# Patient Record
(demographics unavailable — no encounter records)

---

## 2024-12-12 NOTE — PHYSICAL EXAM
[No Supraclavicular Adenopathy] : no supraclavicular adenopathy [No Cervical Adenopathy] : no cervical adenopathy [Clear to Auscultation Bilat] : clear to auscultation bilaterally [Examined in the supine and seated position] : examined in the supine and seated position [Symmetrical] : symmetrical [No dominant masses] : no dominant masses in right breast  [No dominant masses] : no dominant masses left breast [No Nipple Retraction] : no left nipple retraction [No Nipple Discharge] : no left nipple discharge [No Axillary Lymphadenopathy] : no left axillary lymphadenopathy [Soft] : abdomen soft [Not Tender] : non-tender [No Hepato-Splenomegaly] : no hepato-splenomegaly [No Rashes] : no rashes [de-identified] : Status post right nipple sparing mastectomy with D IEP flap.

## 2024-12-12 NOTE — REASON FOR VISIT
[Follow-Up: _____] : a [unfilled] follow-up visit [FreeTextEntry1] : Routine follow-up status post right nipple sparing mastectomy 2016

## 2024-12-12 NOTE — HISTORY OF PRESENT ILLNESS
[FreeTextEntry1] : 3/16 right nipple sparing mastectomy with sentinel node biopsy and D IEP flap 2 foci moderately differentiated invasive ductal carcinoma 9 and 21 mm, ER positive AL/HER-2 negative, node negative, T2 N0 M0, stage IIa, Oncotype 19 TC chemotherapy plus tamoxifen x5 years; letrozole x 1 year, exemestane Mother had breast cancer at age 55 1/16 my risk negative  Patient denies any breast masses or bone pain.  She recently switched to letrozole after developing a nonmalignant uterine polyp, and then to exemestane.  Recent left mammogram and ultrasound is unremarkable, BI-RADS 1.

## 2024-12-17 NOTE — ASSESSMENT
[FreeTextEntry1] : This is a 64 y/o woman with hx of T2No IDC of right breast Er pos her2 negative, s/p mastectomy , oncotype high risk s/p TCx4, on tamoxifen since 2016.  # breast cancer doing well VERN left breast mammo/sono- sept 2022. next due 09/2023 up to date with breast surgeon-Dr Bueno on 09/22/22. did not tolerate letrozole now on aromasin s/P 4 YEARS OF TAMOXIFEN  Tolerating Aromasin very well Plan for treatment till December 2024- elevated BP, if continues will stop AI December 2024 - total 8 years of therapy  Continue to monitor bone density  # osteoporosis/ osteopenia DEXA in 2022 advise every 2 yrs - repeat due now. cont vit d and exercise pt declined prolia zometa or fosamax  # vit d def - 2k cont vit d check level  # iron def - repeat level s/p gi work up cont iron check level due for colonoscopy- script given  # vaginal bleeding - resolved after tamoxifen stopped s/p gyn work up , biopsy negative now off tamoxifen , no further bleeding Continue to follow-up with GYN  # Referred to derm  # htn - FOLLOW UP WITH pcp cHECK CHOLESTEROL WITH pcp  follow up in 6 months  dw patient at length.

## 2024-12-17 NOTE — PHYSICAL EXAM
[Fully active, able to carry on all pre-disease performance without restriction] : Status 0 - Fully active, able to carry on all pre-disease performance without restriction [Normal] : affect appropriate [de-identified] : b/l breast symmetrical. R breast s/p seth flap . no masses or lesions noted. no axillary lymphadenopathy [de-identified] : multiple cherry angiomas . keratotic brown lesion. regular borders noted to R lateral breast .

## 2024-12-17 NOTE — HISTORY OF PRESENT ILLNESS
[Disease: _____________________] : Disease: [unfilled] [T: ___] : T[unfilled] [N: ___] : N[unfilled] [de-identified] : Ms. Puckett is a 61 year old post-menopausal woman diagnosed with a infiltrating ductal carcinoma of the right breast diagnosed in 2016, grade 2, ER+ 70%, [place] -, Her2-lucrecia neg.  BRCA testing was negative. She went on to see Dr King and a breast MRI revealed a second area of abnormality which was biopsied and positive for infiltrating ductal carcinoma.  She underwent a mastectomy with sentinel node biopsy on March 24, 2016 and HEBER flap reconstruction.  There were 2 foci of infiltrating ductal carcinoma measuring 2.1 and 0.9 cm.  Lymph nodes were negative, making her a stage T2N0M0 Stage 2a. An Oncotype showed a recurrence score of 29 making her on the higher end of intermediate with a 19% risk of 10 year distant recurrence with antiestrogen alone.  As a result, she underwent adjuvant TC x 4 and began Tamoxifen in August\par 2016.\par  \par  \par  tolerating letrozole  has been on it since 10/2020 [de-identified] : Patient is here for follow up. Feeling well  did not tolerate letrazole  now on aromasin - plan for 10 years 2026 ..   Mammo-11/2023 dexa-11/2022 GYN-4/2024 [facility]-2023

## 2024-12-26 NOTE — PHYSICAL EXAM
[de-identified] : b/l breast symmetrical. R breast s/p seth flap . no masses or lesions noted. no axillary lymphadenopathy [de-identified] : multiple cherry angiomas . keratotic brown lesion. regular borders noted to R lateral breast .

## 2024-12-26 NOTE — ASSESSMENT
[FreeTextEntry1] : This is a 62 y/o woman with hx of T2No IDC of right breast Er pos her2 negative, s/p mastectomy , oncotype high risk s/p TCx4, on tamoxifen since 2016.  # breast cancer doing well VERN left breast mammo/sono- sept 2022. next due 09/2023 up to date with breast surgeon-Dr Bueno on 09/22/22. did not tolerate letrozole now on aromasin s/P 4 YEARS OF TAMOXIFEN  Tolerating Aromasin very well Plan for treatment till December 2024- elevated BP, if continues will stop AI December 2024 - total 8 years of therapy  Continue to monitor bone density  # osteoporosis/ osteopenia DEXA in 2022 advise every 2 yrs - repeat due now. cont vit d and exercise pt declined prolia zometa or fosamax  # vit d def - 2k cont vit d check level  # iron def - repeat level s/p gi work up cont iron check level due for colonoscopy- script given  # vaginal bleeding - resolved after tamoxifen stopped s/p gyn work up , biopsy negative now off tamoxifen , no further bleeding Continue to follow-up with GYN  # Referred to derm   follow up in 6 months  dw patient at length.

## 2024-12-26 NOTE — HISTORY OF PRESENT ILLNESS
[de-identified] : Ms. Puckett is a 61 year old post-menopausal woman diagnosed with a infiltrating ductal carcinoma of the right breast diagnosed in 2016, grade 2, ER+ 70%, [place] -, Her2-lucrecia neg.  BRCA testing was negative. She went on to see Dr King and a breast MRI revealed a second area of abnormality which was biopsied and positive for infiltrating ductal carcinoma.  She underwent a mastectomy with sentinel node biopsy on March 24, 2016 and HEBER flap reconstruction.  There were 2 foci of infiltrating ductal carcinoma measuring 2.1 and 0.9 cm.  Lymph nodes were negative, making her a stage T2N0M0 Stage 2a. An Oncotype showed a recurrence score of 29 making her on the higher end of intermediate with a 19% risk of 10 year distant recurrence with antiestrogen alone.  As a result, she underwent adjuvant TC x 4 and began Tamoxifen in August\par 2016.\par  \par  \par  tolerating letrozole  has been on it since 10/2020 [de-identified] : Patient is here for follow up. Feeling well  did not tolerate letrazole  now on aromasin - plan for 10 years 2026 ..   Mammo-12/2024 dexa-11/2022 GYN-4/2024 [facility]-2023

## 2024-12-26 NOTE — HISTORY OF PRESENT ILLNESS
[de-identified] : Ms. Puckett is a 61 year old post-menopausal woman diagnosed with a infiltrating ductal carcinoma of the right breast diagnosed in 2016, grade 2, ER+ 70%, [place] -, Her2-lucrecia neg.  BRCA testing was negative. She went on to see Dr King and a breast MRI revealed a second area of abnormality which was biopsied and positive for infiltrating ductal carcinoma.  She underwent a mastectomy with sentinel node biopsy on March 24, 2016 and HEBER flap reconstruction.  There were 2 foci of infiltrating ductal carcinoma measuring 2.1 and 0.9 cm.  Lymph nodes were negative, making her a stage T2N0M0 Stage 2a. An Oncotype showed a recurrence score of 29 making her on the higher end of intermediate with a 19% risk of 10 year distant recurrence with antiestrogen alone.  As a result, she underwent adjuvant TC x 4 and began Tamoxifen in August\par 2016.\par  \par  \par  tolerating letrozole  has been on it since 10/2020 [de-identified] : Patient is here for follow up. Feeling well  did not tolerate letrazole  now on aromasin - plan for 10 years 2026 ..   Mammo-12/2024 dexa-11/2022 GYN-4/2024 [facility]-2023

## 2024-12-26 NOTE — ASSESSMENT
[FreeTextEntry1] : This is a 64 y/o woman with hx of T2No IDC of right breast Er pos her2 negative, s/p mastectomy , oncotype high risk s/p TCx4, on tamoxifen since 2016.  # breast cancer doing well VERN left breast mammo/sono- sept 2022. next due 09/2023 up to date with breast surgeon-Dr Bueno on 09/22/22. did not tolerate letrozole now on aromasin s/P 4 YEARS OF TAMOXIFEN  Tolerating Aromasin very well Plan for treatment till December 2024- elevated BP, if continues will stop AI December 2024 - total 8 years of therapy  Continue to monitor bone density  # osteoporosis/ osteopenia DEXA in 2022 advise every 2 yrs - repeat due now. cont vit d and exercise pt declined prolia zometa or fosamax  # vit d def - 2k cont vit d check level  # iron def - repeat level s/p gi work up cont iron check level due for colonoscopy- script given  # vaginal bleeding - resolved after tamoxifen stopped s/p gyn work up , biopsy negative now off tamoxifen , no further bleeding Continue to follow-up with GYN  # Referred to derm   follow up in 6 months  dw patient at length.

## 2024-12-26 NOTE — PHYSICAL EXAM
[de-identified] : b/l breast symmetrical. R breast s/p seth flap . no masses or lesions noted. no axillary lymphadenopathy [de-identified] : multiple cherry angiomas . keratotic brown lesion. regular borders noted to R lateral breast .